# Patient Record
Sex: MALE | Race: WHITE | NOT HISPANIC OR LATINO | URBAN - METROPOLITAN AREA
[De-identification: names, ages, dates, MRNs, and addresses within clinical notes are randomized per-mention and may not be internally consistent; named-entity substitution may affect disease eponyms.]

---

## 2017-12-20 ENCOUNTER — IMPORTED ENCOUNTER (OUTPATIENT)
Dept: URBAN - METROPOLITAN AREA CLINIC 9 | Facility: CLINIC | Age: 77
End: 2017-12-20

## 2019-02-07 NOTE — PATIENT DISCUSSION
"""S/P IOL OS: Tecnis ZCB00 18.0 (Target: [place]) +Femto/Arcs +TM/Omidria. Continue post operative instructions and drops per schedule.  """

## 2019-02-13 NOTE — PATIENT DISCUSSION
"""S/P IOL OS: Tecnis ZCB00 18.0 (Target: [place]) +Femto/Arcs +TM/Omidria. Continue post operative instructions and drops per schedule.  """

## 2019-05-28 NOTE — PATIENT DISCUSSION
Discussed risks of contact lenses including importance of hand washing, contact lens hygiene, and no swimming. Discussed no contacts 2 weeks after eyelid surgery.

## 2019-10-21 NOTE — PROCEDURE NOTE: SURGICAL
"<span style=""font-weight: bold;"">MR #:&nbsp;</span>&nbsp;[id number]<br /> <br /> <span style=""font-weight: bold;"">PREOPERATIVE DIAGNOSIS:</span>&nbsp;Dermatochalasis upper lids<br /> <br /> <span style=""font-weight: bold;"">POSTOPERATIVE DIAGNOSIS:</span>&nbsp;Same<br /> <br /> <span style=""font-weight: bold;"">OPERATIVE PROCEDURE:</span>&nbsp; Upper lid blepharoplasty both eyes<br /> <br /> <span style=""font-weight: bold;"">ANESTHESIA: &nbsp;</span> &nbsp;&nbsp; Local MAC<br /> <br /> <span style=""font-weight: bold;"">ESTIMATED BLOOD LOSS:</span> &nbsp;Minimal

## 2019-10-31 NOTE — PATIENT DISCUSSION
One week post op: great curve and shape, no infection, healing well, sutures intact and removed, use erythromycin QHS to upper eyelids x 7-10 days. Use Skinuva QHS x 1 month to incisions after 10 days. Wear sunglasses and hat while outdoors. Avoid sun exposure. No restrictions in 5 days.

## 2021-01-18 NOTE — PATIENT DISCUSSION
** 1/19/2021 VF reviewed and dx now POAG mild OU. Recommend I stent at time of cataract surgery for better control of IOP. Pt declines starting glc ggts and opts for surgical option Jeimy**.

## 2021-02-16 NOTE — PATIENT DISCUSSION
Patient advised of the right to post-operative care by the surgeon. Patient is fully informed of, and agreed to, co-management with their primary optometric physician. Post-operative care by the surgeon is not medically necessary and co-management is clinically appropriate. Patient has received itemization of fees related to cataract surgery. Transfer of care letter completed for the patient. Transfer care of right eye to Dr. Timmy Leblanc on 02/16/21. Patient instructed to call immediately if any new distortion, blurring, decreased vision or eye pain.

## 2021-02-16 NOTE — PATIENT DISCUSSION
Patient advised of the right to post-operative care by the surgeon. Patient is fully informed of, and agreed to, co-management with their primary optometric physician. Post-operative care by the surgeon is not medically necessary and co-management is clinically appropriate. Patient has received itemization of fees related to cataract surgery. Transfer of care letter completed for the patient. Transfer care of right eye to Dr. Chance Cooney on 02/16/21. Patient instructed to call immediately if any new distortion, blurring, decreased vision or eye pain.

## 2021-02-22 NOTE — PATIENT DISCUSSION
Patient advised of the right to post-operative care by the surgeon. Patient is fully informed of, and agreed to, co-management with their primary optometric physician. Post-operative care by the surgeon is not medically necessary and co-management is clinically appropriate. Patient has received itemization of fees related to cataract surgery. Transfer of care letter completed for the patient. Transfer care of right eye to Dr. Shakira Jensen on 02/16/21. Patient instructed to call immediately if any new distortion, blurring, decreased vision or eye pain.

## 2021-02-22 NOTE — PATIENT DISCUSSION
Patient advised of the right to post-operative care by the surgeon. Patient is fully informed of, and agreed to, co-management with their primary optometric physician. Post-operative care by the surgeon is not medically necessary and co-management is clinically appropriate. Patient has received itemization of fees related to cataract surgery. Transfer of care letter completed for the patient. Transfer care of right eye to Dr. Liliana Acevedo on 02/16/21. Patient instructed to call immediately if any new distortion, blurring, decreased vision or eye pain.

## 2021-02-22 NOTE — PATIENT DISCUSSION
Patient advised of the right to post-operative care by the surgeon. Patient is fully informed of, and agreed to, co-management with their primary optometric physician. Post-operative care by the surgeon is not medically necessary and co-management is clinically appropriate. Patient has received itemization of fees related to cataract surgery. Transfer of care letter completed for the patient. Transfer care of right eye to Dr. River Tao on 02/16/21. Patient instructed to call immediately if any new distortion, blurring, decreased vision or eye pain.

## 2021-05-05 ENCOUNTER — IMPORTED ENCOUNTER (OUTPATIENT)
Dept: URBAN - METROPOLITAN AREA CLINIC 9 | Facility: CLINIC | Age: 81
End: 2021-05-05

## 2021-06-07 ENCOUNTER — IMPORTED ENCOUNTER (OUTPATIENT)
Dept: URBAN - METROPOLITAN AREA CLINIC 9 | Facility: CLINIC | Age: 81
End: 2021-06-07

## 2021-10-18 ASSESSMENT — TONOMETRY
OD_IOP_MMHG: 14
OD_IOP_MMHG: 20
OS_IOP_MMHG: 16
OS_IOP_MMHG: 19

## 2021-10-18 ASSESSMENT — VISUAL ACUITY
OD_CC: 20/25 - SN
OS_CC: 20/20 SN
OD_SC: 20/70 SN
OS_SC: 20/40 SN
OS_SC: 20/30 -2 SN
OD_SC: 20/60 SN

## 2021-12-14 NOTE — PATIENT DISCUSSION
Discussed risks of contact lenses including importance of hand washing, contact lens hygiene, and no swimming. Discussed no contacts 2 weeks after eyelid surgery. ARIANNAr Michell

## 2022-07-04 RX ORDER — NIFEDIPINE 60 MG/1
TABLET, FILM COATED, EXTENDED RELEASE ORAL
COMMUNITY

## 2022-07-04 RX ORDER — GLIPIZIDE 2.5 MG/1
TABLET, EXTENDED RELEASE ORAL
COMMUNITY

## 2022-07-04 RX ORDER — SOLIFENACIN SUCCINATE 10 MG/1
TABLET, FILM COATED ORAL
COMMUNITY

## 2022-07-04 RX ORDER — ATORVASTATIN CALCIUM 40 MG/1
TABLET, FILM COATED ORAL
COMMUNITY

## 2022-07-04 RX ORDER — OMEPRAZOLE 40 MG/1
CAPSULE, DELAYED RELEASE ORAL
COMMUNITY

## 2022-07-04 RX ORDER — TAMSULOSIN HYDROCHLORIDE 0.4 MG/1
1 CAPSULE ORAL
COMMUNITY

## 2022-07-04 RX ORDER — CLOPIDOGREL BISULFATE 75 MG/1
TABLET ORAL
COMMUNITY

## 2023-04-10 ENCOUNTER — ESTABLISHED PATIENT (OUTPATIENT)
Dept: URBAN - METROPOLITAN AREA CLINIC 4 | Facility: CLINIC | Age: 83
End: 2023-04-10

## 2023-04-10 DIAGNOSIS — H35.352: ICD-10-CM

## 2023-04-10 DIAGNOSIS — H43.811: ICD-10-CM

## 2023-04-10 PROCEDURE — 92012 INTRM OPH EXAM EST PATIENT: CPT

## 2023-04-10 PROCEDURE — 92134 CPTRZ OPH DX IMG PST SGM RTA: CPT

## 2023-04-10 ASSESSMENT — TONOMETRY
OD_IOP_MMHG: 23
OS_IOP_MMHG: 21

## 2023-04-10 ASSESSMENT — VISUAL ACUITY
OS_SC: 20/30-2
OD_SC: 20/80

## 2023-09-27 ENCOUNTER — ESTABLISHED PATIENT (OUTPATIENT)
Dept: URBAN - METROPOLITAN AREA CLINIC 4 | Facility: CLINIC | Age: 83
End: 2023-09-27

## 2023-09-27 DIAGNOSIS — H35.352: ICD-10-CM

## 2023-09-27 DIAGNOSIS — H43.811: ICD-10-CM

## 2023-09-27 PROCEDURE — 92134 CPTRZ OPH DX IMG PST SGM RTA: CPT

## 2023-09-27 PROCEDURE — 92014 COMPRE OPH EXAM EST PT 1/>: CPT

## 2023-09-27 ASSESSMENT — VISUAL ACUITY
OS_SC: 20/30-1
OD_SC: 20/80-1

## 2023-09-27 ASSESSMENT — TONOMETRY
OD_IOP_MMHG: 14
OS_IOP_MMHG: 16

## 2024-04-02 ENCOUNTER — ESTABLISHED PATIENT (OUTPATIENT)
Dept: URBAN - METROPOLITAN AREA CLINIC 18 | Facility: CLINIC | Age: 84
End: 2024-04-02

## 2024-04-02 DIAGNOSIS — H35.373: ICD-10-CM

## 2024-04-02 DIAGNOSIS — H43.811: ICD-10-CM

## 2024-04-02 DIAGNOSIS — E11.9: ICD-10-CM

## 2024-04-02 PROCEDURE — 92014 COMPRE OPH EXAM EST PT 1/>: CPT | Mod: 57

## 2024-04-02 PROCEDURE — 92201 OPSCPY EXTND RTA DRAW UNI/BI: CPT

## 2024-04-02 PROCEDURE — 92134 CPTRZ OPH DX IMG PST SGM RTA: CPT

## 2024-04-02 ASSESSMENT — VISUAL ACUITY
OU_SC: 20/30-1
OD_SC: 20/60-1
OS_SC: 20/25-2

## 2024-04-02 ASSESSMENT — TONOMETRY
OS_IOP_MMHG: 18
OD_IOP_MMHG: 14

## 2024-04-23 ENCOUNTER — PREPPED CHART (OUTPATIENT)
Dept: URBAN - METROPOLITAN AREA CLINIC 18 | Facility: CLINIC | Age: 84
End: 2024-04-23